# Patient Record
Sex: FEMALE | Race: WHITE | NOT HISPANIC OR LATINO | Employment: OTHER
[De-identification: names, ages, dates, MRNs, and addresses within clinical notes are randomized per-mention and may not be internally consistent; named-entity substitution may affect disease eponyms.]

---

## 2017-01-19 PROBLEM — H25.9 AGE-RELATED CATARACT OF BOTH EYES, UNSPECIFIED AGE-RELATED CATARACT TYPE: Status: ACTIVE | Noted: 2017-01-19

## 2017-01-19 PROBLEM — H25.9 AGE-RELATED CATARACT OF BOTH EYES, UNSPECIFIED AGE-RELATED CATARACT TYPE: Status: RESOLVED | Noted: 2017-01-19 | Resolved: 2017-01-19

## 2017-01-19 PROBLEM — H91.93 BILATERAL HEARING LOSS, UNSPECIFIED HEARING LOSS TYPE: Status: ACTIVE | Noted: 2017-01-19

## 2017-01-19 PROCEDURE — 84443 ASSAY THYROID STIM HORMONE: CPT | Performed by: INTERNAL MEDICINE

## 2017-01-19 PROCEDURE — 80061 LIPID PANEL: CPT | Performed by: INTERNAL MEDICINE

## 2017-01-19 PROCEDURE — 36415 COLL VENOUS BLD VENIPUNCTURE: CPT | Performed by: INTERNAL MEDICINE

## 2017-01-19 PROCEDURE — 82306 VITAMIN D 25 HYDROXY: CPT | Performed by: INTERNAL MEDICINE

## 2017-01-19 PROCEDURE — 80053 COMPREHEN METABOLIC PANEL: CPT | Performed by: INTERNAL MEDICINE

## 2017-03-22 PROCEDURE — 84295 ASSAY OF SERUM SODIUM: CPT | Performed by: INTERNAL MEDICINE

## 2017-03-22 PROCEDURE — 36415 COLL VENOUS BLD VENIPUNCTURE: CPT | Performed by: INTERNAL MEDICINE

## 2017-05-25 PROBLEM — H90.3 SENSORINEURAL HEARING LOSS (SNHL) OF BOTH EARS: Status: ACTIVE | Noted: 2017-05-25

## 2017-05-25 PROBLEM — H93.13 TINNITUS OF BOTH EARS: Status: ACTIVE | Noted: 2017-05-25

## 2017-07-06 PROBLEM — H90.3 SENSORINEURAL HEARING LOSS (SNHL), BILATERAL: Status: ACTIVE | Noted: 2017-05-25

## 2017-07-20 PROCEDURE — 80048 BASIC METABOLIC PNL TOTAL CA: CPT | Performed by: INTERNAL MEDICINE

## 2017-07-20 PROCEDURE — 80061 LIPID PANEL: CPT | Performed by: INTERNAL MEDICINE

## 2017-07-20 PROCEDURE — 82306 VITAMIN D 25 HYDROXY: CPT | Performed by: INTERNAL MEDICINE

## 2017-07-20 PROCEDURE — 36415 COLL VENOUS BLD VENIPUNCTURE: CPT | Performed by: INTERNAL MEDICINE

## 2017-09-28 PROBLEM — M54.41 ACUTE RIGHT-SIDED LOW BACK PAIN WITH RIGHT-SIDED SCIATICA: Status: ACTIVE | Noted: 2017-09-28

## 2017-10-04 PROBLEM — G89.29 CHRONIC RIGHT-SIDED LOW BACK PAIN WITH RIGHT-SIDED SCIATICA: Status: ACTIVE | Noted: 2017-10-04

## 2017-10-04 PROBLEM — M54.41 CHRONIC RIGHT-SIDED LOW BACK PAIN WITH RIGHT-SIDED SCIATICA: Status: ACTIVE | Noted: 2017-10-04

## 2017-11-15 PROBLEM — M51.379 DEGENERATION OF LUMBAR OR LUMBOSACRAL INTERVERTEBRAL DISC: Status: ACTIVE | Noted: 2017-11-15

## 2017-11-15 PROBLEM — M41.26 OTHER IDIOPATHIC SCOLIOSIS, LUMBAR REGION: Status: ACTIVE | Noted: 2017-11-15

## 2017-11-15 PROBLEM — M47.817 LUMBOSACRAL SPONDYLOSIS WITHOUT MYELOPATHY: Status: ACTIVE | Noted: 2017-11-15

## 2017-11-15 PROBLEM — M51.37 DEGENERATION OF LUMBAR OR LUMBOSACRAL INTERVERTEBRAL DISC: Status: ACTIVE | Noted: 2017-11-15

## 2017-11-28 ENCOUNTER — HOSPITAL (OUTPATIENT)
Dept: OTHER | Age: 77
End: 2017-11-28
Attending: INTERNAL MEDICINE

## 2018-01-03 PROCEDURE — 87081 CULTURE SCREEN ONLY: CPT | Performed by: PHYSICAL MEDICINE & REHABILITATION

## 2018-01-04 ENCOUNTER — HOSPITAL (OUTPATIENT)
Dept: OTHER | Age: 78
End: 2018-01-04
Attending: INTERNAL MEDICINE

## 2018-01-04 LAB
ANALYZER ANC (IANC): ABNORMAL
ANION GAP SERPL CALC-SCNC: 15 MMOL/L (ref 10–20)
APTT PPP: 30 SECONDS (ref 22–30)
APTT PPP: NORMAL S
BASOPHILS # BLD: 0 THOUSAND/MCL (ref 0–0.3)
BASOPHILS NFR BLD: 0 %
BUN SERPL-MCNC: 16 MG/DL (ref 6–20)
BUN/CREAT SERPL: 27 (ref 7–25)
CALCIUM SERPL-MCNC: 9 MG/DL (ref 8.4–10.2)
CHLORIDE: 97 MMOL/L (ref 98–107)
CO2 SERPL-SCNC: 24 MMOL/L (ref 21–32)
CREAT SERPL-MCNC: 0.6 MG/DL (ref 0.51–0.95)
DIFFERENTIAL METHOD BLD: ABNORMAL
EOSINOPHIL # BLD: 0.1 THOUSAND/MCL (ref 0.1–0.5)
EOSINOPHIL NFR BLD: 1 %
ERYTHROCYTE [DISTWIDTH] IN BLOOD: 13.9 % (ref 11–15)
GLUCOSE SERPL-MCNC: 113 MG/DL (ref 65–99)
HEMATOCRIT: 32 % (ref 36–46.5)
HGB BLD-MCNC: 10.8 GM/DL (ref 12–15.5)
INR PPP: 1.1
LYMPHOCYTES # BLD: 1 THOUSAND/MCL (ref 1–4)
LYMPHOCYTES NFR BLD: 11 %
MCH RBC QN AUTO: 28.9 PG (ref 26–34)
MCHC RBC AUTO-ENTMCNC: 33.8 GM/DL (ref 32–36.5)
MCV RBC AUTO: 85.6 FL (ref 78–100)
MONOCYTES # BLD: 1 THOUSAND/MCL (ref 0.3–0.9)
MONOCYTES NFR BLD: 10 %
NEUTROPHILS # BLD: 7.7 THOUSAND/MCL (ref 1.8–7.7)
NEUTROPHILS NFR BLD: 78 %
NEUTS SEG NFR BLD: ABNORMAL %
PERCENT NRBC: ABNORMAL
PLATELET # BLD: 265 THOUSAND/MCL (ref 140–450)
POTASSIUM SERPL-SCNC: 3.6 MMOL/L (ref 3.4–5.1)
PROTHROMBIN TIME: 11.2 SECONDS (ref 9.7–11.8)
PROTHROMBIN TIME: NORMAL
RBC # BLD: 3.74 MILLION/MCL (ref 4–5.2)
SODIUM SERPL-SCNC: 132 MMOL/L (ref 135–145)
WBC # BLD: 9.7 THOUSAND/MCL (ref 4.2–11)

## 2018-01-05 LAB
ALBUMIN SERPL-MCNC: 3.1 GM/DL (ref 3.6–5.1)
ALBUMIN/GLOB SERPL: 0.8 {RATIO} (ref 1–2.4)
ALP SERPL-CCNC: 94 UNIT/L (ref 45–117)
ALT SERPL-CCNC: 14 UNIT/L
ANALYZER ANC (IANC): ABNORMAL
ANION GAP SERPL CALC-SCNC: 15 MMOL/L (ref 10–20)
AST SERPL-CCNC: 16 UNIT/L
BASOPHILS # BLD: 0 THOUSAND/MCL (ref 0–0.3)
BASOPHILS NFR BLD: 0 %
BILIRUB SERPL-MCNC: 0.3 MG/DL (ref 0.2–1)
BUN SERPL-MCNC: 22 MG/DL (ref 6–20)
BUN/CREAT SERPL: 35 (ref 7–25)
CALCIUM SERPL-MCNC: 8.7 MG/DL (ref 8.4–10.2)
CHLORIDE: 99 MMOL/L (ref 98–107)
CO2 SERPL-SCNC: 24 MMOL/L (ref 21–32)
CREAT SERPL-MCNC: 0.63 MG/DL (ref 0.51–0.95)
DIFFERENTIAL METHOD BLD: ABNORMAL
EOSINOPHIL # BLD: 0.1 THOUSAND/MCL (ref 0.1–0.5)
EOSINOPHIL NFR BLD: 1 %
ERYTHROCYTE [DISTWIDTH] IN BLOOD: 13.9 % (ref 11–15)
GLOBULIN SER-MCNC: 3.8 GM/DL (ref 2–4)
GLUCOSE SERPL-MCNC: 119 MG/DL (ref 65–99)
HEMATOCRIT: 31.4 % (ref 36–46.5)
HGB BLD-MCNC: 10.6 GM/DL (ref 12–15.5)
IRON SATN MFR SERPL: 9 % (ref 15–45)
IRON SERPL-MCNC: 23 MCG/DL (ref 50–170)
LYMPHOCYTES # BLD: 0.9 THOUSAND/MCL (ref 1–4)
LYMPHOCYTES NFR BLD: 12 %
MCH RBC QN AUTO: 28.6 PG (ref 26–34)
MCHC RBC AUTO-ENTMCNC: 33.8 GM/DL (ref 32–36.5)
MCV RBC AUTO: 84.6 FL (ref 78–100)
MONOCYTES # BLD: 0.9 THOUSAND/MCL (ref 0.3–0.9)
MONOCYTES NFR BLD: 12 %
NEUTROPHILS # BLD: 5.8 THOUSAND/MCL (ref 1.8–7.7)
NEUTROPHILS NFR BLD: 75 %
NEUTS SEG NFR BLD: ABNORMAL %
PERCENT NRBC: ABNORMAL
PLATELET # BLD: 265 THOUSAND/MCL (ref 140–450)
POTASSIUM SERPL-SCNC: 3.5 MMOL/L (ref 3.4–5.1)
PROT SERPL-MCNC: 6.9 GM/DL (ref 6.4–8.2)
PTH-INTACT SERPL-MCNC: 33 PG/ML (ref 14–72)
RBC # BLD: 3.71 MILLION/MCL (ref 4–5.2)
SODIUM SERPL-SCNC: 134 MMOL/L (ref 135–145)
T3FREE SERPL-MCNC: 2 PG/ML (ref 2.2–4)
T4 FREE SERPL-MCNC: 1.5 NG/DL (ref 0.8–1.5)
TIBC SERPL-MCNC: 257 MCG/DL (ref 250–450)
TSH SERPL-ACNC: 0.24 MCUNIT/ML (ref 0.35–5)
VIT B12 SERPL-MCNC: 494 PG/ML (ref 211–911)
WBC # BLD: 7.7 THOUSAND/MCL (ref 4.2–11)

## 2018-01-06 LAB
ANALYZER ANC (IANC): ABNORMAL
ANION GAP SERPL CALC-SCNC: 14 MMOL/L (ref 10–20)
BASOPHILS # BLD: 0 THOUSAND/MCL (ref 0–0.3)
BASOPHILS NFR BLD: 0 %
BUN SERPL-MCNC: 34 MG/DL (ref 6–20)
BUN/CREAT SERPL: 43 (ref 7–25)
CALCIUM SERPL-MCNC: 8.9 MG/DL (ref 8.4–10.2)
CHLORIDE: 98 MMOL/L (ref 98–107)
CO2 SERPL-SCNC: 25 MMOL/L (ref 21–32)
CREAT SERPL-MCNC: 0.8 MG/DL (ref 0.51–0.95)
DIFFERENTIAL METHOD BLD: ABNORMAL
EOSINOPHIL # BLD: 0.2 THOUSAND/MCL (ref 0.1–0.5)
EOSINOPHIL NFR BLD: 2 %
ERYTHROCYTE [DISTWIDTH] IN BLOOD: 13.8 % (ref 11–15)
GLUCOSE SERPL-MCNC: 107 MG/DL (ref 65–99)
HEMATOCRIT: 32.2 % (ref 36–46.5)
HGB BLD-MCNC: 10.7 GM/DL (ref 12–15.5)
LYMPHOCYTES # BLD: 2.4 THOUSAND/MCL (ref 1–4)
LYMPHOCYTES NFR BLD: 29 %
MCH RBC QN AUTO: 28.2 PG (ref 26–34)
MCHC RBC AUTO-ENTMCNC: 33.2 GM/DL (ref 32–36.5)
MCV RBC AUTO: 85 FL (ref 78–100)
MONOCYTES # BLD: 1 THOUSAND/MCL (ref 0.3–0.9)
MONOCYTES NFR BLD: 12 %
NEUTROPHILS # BLD: 4.6 THOUSAND/MCL (ref 1.8–7.7)
NEUTROPHILS NFR BLD: 57 %
NEUTS SEG NFR BLD: ABNORMAL %
PERCENT NRBC: ABNORMAL
PLATELET # BLD: 271 THOUSAND/MCL (ref 140–450)
POTASSIUM SERPL-SCNC: 3.8 MMOL/L (ref 3.4–5.1)
RBC # BLD: 3.79 MILLION/MCL (ref 4–5.2)
SODIUM SERPL-SCNC: 133 MMOL/L (ref 135–145)
WBC # BLD: 8.2 THOUSAND/MCL (ref 4.2–11)

## 2018-01-07 LAB
ANALYZER ANC (IANC): ABNORMAL
ANION GAP SERPL CALC-SCNC: 14 MMOL/L (ref 10–20)
BASOPHILS # BLD: 0 THOUSAND/MCL (ref 0–0.3)
BASOPHILS NFR BLD: 0 %
BUN SERPL-MCNC: 33 MG/DL (ref 6–20)
BUN/CREAT SERPL: 46 (ref 7–25)
CALCIUM SERPL-MCNC: 8.7 MG/DL (ref 8.4–10.2)
CHLORIDE: 96 MMOL/L (ref 98–107)
CO2 SERPL-SCNC: 26 MMOL/L (ref 21–32)
CREAT SERPL-MCNC: 0.72 MG/DL (ref 0.51–0.95)
DIFFERENTIAL METHOD BLD: ABNORMAL
EOSINOPHIL # BLD: 0.1 THOUSAND/MCL (ref 0.1–0.5)
EOSINOPHIL NFR BLD: 2 %
ERYTHROCYTE [DISTWIDTH] IN BLOOD: 13.7 % (ref 11–15)
GLUCOSE SERPL-MCNC: 100 MG/DL (ref 65–99)
HEMATOCRIT: 30.3 % (ref 36–46.5)
HGB BLD-MCNC: 10.3 GM/DL (ref 12–15.5)
LYMPHOCYTES # BLD: 2.1 THOUSAND/MCL (ref 1–4)
LYMPHOCYTES NFR BLD: 24 %
MCH RBC QN AUTO: 29 PG (ref 26–34)
MCHC RBC AUTO-ENTMCNC: 34 GM/DL (ref 32–36.5)
MCV RBC AUTO: 85.4 FL (ref 78–100)
MONOCYTES # BLD: 1.1 THOUSAND/MCL (ref 0.3–0.9)
MONOCYTES NFR BLD: 13 %
NEUTROPHILS # BLD: 5.4 THOUSAND/MCL (ref 1.8–7.7)
NEUTROPHILS NFR BLD: 61 %
NEUTS SEG NFR BLD: ABNORMAL %
PERCENT NRBC: ABNORMAL
PLATELET # BLD: 274 THOUSAND/MCL (ref 140–450)
POTASSIUM SERPL-SCNC: 4.4 MMOL/L (ref 3.4–5.1)
RBC # BLD: 3.55 MILLION/MCL (ref 4–5.2)
SODIUM SERPL-SCNC: 132 MMOL/L (ref 135–145)
WBC # BLD: 8.7 THOUSAND/MCL (ref 4.2–11)

## 2018-03-02 PROBLEM — M54.41 ACUTE RIGHT-SIDED LOW BACK PAIN WITH RIGHT-SIDED SCIATICA: Status: RESOLVED | Noted: 2017-09-28 | Resolved: 2018-03-02

## 2018-03-02 PROBLEM — F11.20 UNCOMPLICATED OPIOID DEPENDENCE (HCC): Status: ACTIVE | Noted: 2018-03-02

## 2018-03-02 PROCEDURE — 87081 CULTURE SCREEN ONLY: CPT | Performed by: INTERNAL MEDICINE

## 2018-03-14 PROBLEM — M81.0 OSTEOPOROSIS: Status: ACTIVE | Noted: 2018-03-14

## 2018-03-14 PROCEDURE — 83970 ASSAY OF PARATHORMONE: CPT | Performed by: PHYSICIAN ASSISTANT

## 2018-03-25 PROBLEM — R29.898 MUSCULAR DECONDITIONING: Status: ACTIVE | Noted: 2018-03-25

## 2018-04-04 PROBLEM — M81.0 OSTEOPOROSIS: Status: RESOLVED | Noted: 2018-03-14 | Resolved: 2018-04-04

## 2018-04-04 PROBLEM — H91.93 BILATERAL HEARING LOSS, UNSPECIFIED HEARING LOSS TYPE: Status: RESOLVED | Noted: 2017-01-19 | Resolved: 2018-04-04

## 2018-04-25 PROBLEM — K42.9 PERIUMBILICAL HERNIA: Status: ACTIVE | Noted: 2018-04-25

## 2018-08-21 ENCOUNTER — HOSPITAL (OUTPATIENT)
Dept: OTHER | Age: 78
End: 2018-08-21
Attending: INTERNAL MEDICINE

## 2018-12-04 ENCOUNTER — HOSPITAL (OUTPATIENT)
Dept: OTHER | Age: 78
End: 2018-12-04
Attending: INTERNAL MEDICINE

## 2019-04-16 PROBLEM — Z51.81 ENCOUNTER FOR MEDICATION MONITORING: Status: ACTIVE | Noted: 2019-04-16

## 2019-04-16 PROBLEM — R63.5 WEIGHT GAIN: Status: ACTIVE | Noted: 2019-04-16

## 2019-04-16 PROBLEM — M19.011 PRIMARY OSTEOARTHRITIS OF RIGHT SHOULDER: Status: ACTIVE | Noted: 2019-04-16

## 2019-04-16 PROBLEM — Z13.1 DIABETES MELLITUS SCREENING: Status: ACTIVE | Noted: 2019-04-16

## 2019-07-11 PROBLEM — M75.101 ROTATOR CUFF SYNDROME OF BOTH SHOULDERS: Status: ACTIVE | Noted: 2019-07-11

## 2019-07-11 PROBLEM — M75.102 ROTATOR CUFF SYNDROME OF BOTH SHOULDERS: Status: ACTIVE | Noted: 2019-07-11

## 2019-10-17 PROBLEM — M25.511 BILATERAL SHOULDER PAIN, UNSPECIFIED CHRONICITY: Status: ACTIVE | Noted: 2019-10-17

## 2019-10-17 PROBLEM — M25.512 BILATERAL SHOULDER PAIN, UNSPECIFIED CHRONICITY: Status: ACTIVE | Noted: 2019-10-17

## 2019-10-17 PROBLEM — M18.12 PRIMARY OSTEOARTHRITIS OF FIRST CARPOMETACARPAL JOINT OF LEFT HAND: Status: ACTIVE | Noted: 2019-10-17

## 2019-12-12 ENCOUNTER — HOSPITAL (OUTPATIENT)
Dept: OTHER | Age: 79
End: 2019-12-12
Attending: INTERNAL MEDICINE

## 2020-02-14 PROBLEM — M18.11 PRIMARY OSTEOARTHRITIS OF FIRST CARPOMETACARPAL JOINT OF RIGHT HAND: Status: ACTIVE | Noted: 2020-02-14

## 2020-09-04 ENCOUNTER — HOSPITAL ENCOUNTER (OUTPATIENT)
Dept: GENERAL RADIOLOGY | Age: 80
Discharge: HOME | End: 2020-09-04
Attending: INTERNAL MEDICINE

## 2020-09-04 DIAGNOSIS — M89.9 DISORDER OF BONE, UNSPECIFIED: ICD-10-CM

## 2020-09-04 PROCEDURE — 77080 DXA BONE DENSITY AXIAL: CPT

## 2020-12-22 ENCOUNTER — HOSPITAL ENCOUNTER (OUTPATIENT)
Dept: MAMMOGRAPHY | Age: 80
Discharge: HOME | End: 2020-12-22
Attending: INTERNAL MEDICINE

## 2020-12-22 DIAGNOSIS — Z12.31 ENCOUNTER FOR SCREENING MAMMOGRAM FOR BREAST CANCER: ICD-10-CM

## 2020-12-22 PROCEDURE — 77063 BREAST TOMOSYNTHESIS BI: CPT

## 2021-05-11 PROBLEM — M51.379 DEGENERATION OF LUMBAR OR LUMBOSACRAL INTERVERTEBRAL DISC: Status: RESOLVED | Noted: 2017-11-15 | Resolved: 2021-05-11

## 2021-05-11 PROBLEM — M51.37 DEGENERATION OF LUMBAR OR LUMBOSACRAL INTERVERTEBRAL DISC: Status: RESOLVED | Noted: 2017-11-15 | Resolved: 2021-05-11

## 2021-09-01 PROBLEM — M25.571 PAIN IN JOINT INVOLVING RIGHT ANKLE AND FOOT: Status: ACTIVE | Noted: 2021-09-01

## 2021-09-01 PROBLEM — M25.571 SINUS TARSI SYNDROME OF RIGHT ANKLE: Status: ACTIVE | Noted: 2021-09-01

## 2021-09-01 PROBLEM — Q66.6 CALCANEOVALGUS DEFORMITY OF BOTH FEET: Status: ACTIVE | Noted: 2021-09-01

## 2021-09-01 PROBLEM — M19.079 ARTHRITIS OF ANKLE: Status: ACTIVE | Noted: 2021-09-01

## 2021-12-30 ENCOUNTER — HOSPITAL ENCOUNTER (OUTPATIENT)
Dept: MAMMOGRAPHY | Age: 81
Discharge: HOME | End: 2021-12-30
Attending: INTERNAL MEDICINE

## 2021-12-30 DIAGNOSIS — Z12.31 ENCOUNTER FOR SCREENING MAMMOGRAM FOR MALIGNANT NEOPLASM OF BREAST: ICD-10-CM

## 2021-12-30 PROCEDURE — 77063 BREAST TOMOSYNTHESIS BI: CPT

## 2022-03-15 PROBLEM — M19.079 ARTHRITIS OF ANKLE: Status: RESOLVED | Noted: 2021-09-01 | Resolved: 2022-03-15

## 2022-03-15 PROBLEM — R29.898 MUSCULAR DECONDITIONING: Status: RESOLVED | Noted: 2018-03-25 | Resolved: 2022-03-15

## 2022-03-15 PROBLEM — M25.571 PAIN IN JOINT INVOLVING RIGHT ANKLE AND FOOT: Status: RESOLVED | Noted: 2021-09-01 | Resolved: 2022-03-15

## 2022-03-15 PROBLEM — K42.9 PERIUMBILICAL HERNIA: Status: RESOLVED | Noted: 2018-04-25 | Resolved: 2022-03-15

## 2022-09-27 ENCOUNTER — HOSPITAL ENCOUNTER (OUTPATIENT)
Dept: GENERAL RADIOLOGY | Age: 82
Discharge: HOME | End: 2022-09-27
Attending: INTERNAL MEDICINE

## 2022-09-27 DIAGNOSIS — M81.0 SENILE OSTEOPOROSIS: ICD-10-CM

## 2022-09-27 PROCEDURE — 77080 DXA BONE DENSITY AXIAL: CPT

## 2023-01-12 ENCOUNTER — HOSPITAL ENCOUNTER (OUTPATIENT)
Dept: MAMMOGRAPHY | Age: 83
Discharge: HOME | End: 2023-01-12
Attending: INTERNAL MEDICINE

## 2023-01-12 DIAGNOSIS — Z12.31 VISIT FOR SCREENING MAMMOGRAM: ICD-10-CM

## 2023-01-12 PROCEDURE — 77063 BREAST TOMOSYNTHESIS BI: CPT

## 2023-01-12 PROCEDURE — 77067 SCR MAMMO BI INCL CAD: CPT

## 2024-02-14 DIAGNOSIS — Z12.31 VISIT FOR SCREENING MAMMOGRAM: Primary | ICD-10-CM

## 2024-02-21 ENCOUNTER — HOSPITAL ENCOUNTER (OUTPATIENT)
Dept: MAMMOGRAPHY | Age: 84
Discharge: HOME | End: 2024-02-21
Attending: INTERNAL MEDICINE

## 2024-02-21 DIAGNOSIS — Z12.31 VISIT FOR SCREENING MAMMOGRAM: ICD-10-CM

## 2024-02-21 PROCEDURE — 77067 SCR MAMMO BI INCL CAD: CPT

## 2024-12-13 ENCOUNTER — LAB REQUISITION (OUTPATIENT)
Age: 84
End: 2024-12-13
Payer: MEDICARE

## 2024-12-13 DIAGNOSIS — D64.9 NORMOCYTIC ANEMIA: ICD-10-CM

## 2024-12-13 PROCEDURE — 88184 FLOWCYTOMETRY/ TC 1 MARKER: CPT | Performed by: INTERNAL MEDICINE

## 2024-12-13 PROCEDURE — 88185 FLOWCYTOMETRY/TC ADD-ON: CPT | Performed by: INTERNAL MEDICINE

## 2024-12-13 PROCEDURE — 88342 IMHCHEM/IMCYTCHM 1ST ANTB: CPT | Performed by: PATHOLOGY

## 2024-12-17 ENCOUNTER — LAB REQUISITION (OUTPATIENT)
Age: 84
End: 2024-12-17
Payer: MEDICARE

## 2024-12-17 DIAGNOSIS — D48.9 NEOPLASM OF UNCERTAIN BEHAVIOR: ICD-10-CM

## 2024-12-27 LAB
CD10 CELLS NFR SPEC: <1 %
CD10/CD19: <1 %
CD19 CELLS NFR SPEC: 7 %
CD19+/CD200+: 4 %
CD2 CELLS NFR SPEC: 92 %
CD20 CELLS NFR SPEC: 7 %
CD200 CELLS: 8 %
CD3 CELLS NFR SPEC: 78 %
CD3+/TCRGD+: <1 %
CD3+CD4+ CELLS NFR SPEC: 56 %
CD3+CD4+ CELLS/CD3+CD8+ CLL SPEC: 2.5
CD3+CD8+ CELLS NFR SPEC: 22 %
CD3-/CD56+: 14 %
CD34 CELLS NFR SPEC: <1 %
CD38 CELLS NFR SPEC: 6 %
CD38+/CD19+: <1 %
CD45 CELLS NFR SPEC: 100 %
CD5 CELLS NFR SPEC: 77 %
CD5/CD19 CELLS: <1 %
CD7 CELLS NFR SPEC: 88 %
CELL SURF KAPPA/LAMBDA RATIO: 2
CELL SURF LAMBDA LIGHT CHAIN: 2 %
CELL SURFACE KAPPA LIGHT CHAIN: 4 %
TCR G-D CELLS NFR SPEC: <1 %

## 2025-04-16 RX ORDER — LATANOPROST 50 UG/ML
SOLUTION/ DROPS OPHTHALMIC EVERY MORNING
COMMUNITY
Start: 2022-05-10

## 2025-04-16 RX ORDER — MULTIVITAMIN WITH IRON
250 TABLET ORAL 2 TIMES DAILY
COMMUNITY

## 2025-04-16 RX ORDER — FUROSEMIDE 20 MG/1
20 TABLET ORAL EVERY MORNING
COMMUNITY
Start: 2024-12-18

## 2025-04-16 RX ORDER — HYDROCODONE BITARTRATE AND ACETAMINOPHEN 10; 325 MG/1; MG/1
TABLET ORAL
COMMUNITY
Start: 2018-01-08 | End: 2025-04-18

## 2025-04-16 RX ORDER — AZELASTINE 1 MG/ML
2 SPRAY, METERED NASAL 2 TIMES DAILY PRN
COMMUNITY
Start: 2025-02-23

## 2025-04-16 RX ORDER — CODEINE PHOSPHATE AND GUAIFENESIN 10; 100 MG/5ML; MG/5ML
5 SOLUTION ORAL 4 TIMES DAILY PRN
COMMUNITY
Start: 2024-04-09

## 2025-04-16 NOTE — DISCHARGE INSTRUCTIONS
HOME INSTRUCTIONS  What to expect post-operatively:     Your Dressings:  Do NOT remove post operative dressing/splint until office visit  You may shower the next day, cover the dressing to keep dry    Your Discomfort/Pain:  Mild/Moderate discomfort is NORMAL after surgery  This usually improves after 3 days  Take Over-the-counter medications for mild pain if appropriate  A prescription for moderate pain/inflammation will be provided  Swelling and bruising are very NORMAL and EXPECTED  Your Fingers may be more difficult to move due to swelling   Elevating the limb above the heart may alleviate swelling  You may rest limb on pillows when seated/laying down.    Your Restrictions:  You are able to drive the next day if no longer taking narcotics  No pushing, pulling or lifting with surgical limb  For work notes, inform my medical assistant, who provide this for you  Call [phone] to arrange pick-up or if you would like it faxed    Your First Office Visit (~2 weeks):  Sutures will be removed if neccessary  New “skin tape” may be placed to protect your incision  Begin scar massage one week after sutures are removed     If you have any Questions, please call [phone]      [facility] [place] HOME CARE INSTRUCTIONS: POST-OP ANESTHESIA  The medication that you received for sedation or general anesthesia can last up to 24 hours. Your judgment and reflexes may be altered, even if you feel like your normal self.      We Recommend:   Do not drive any motor vehicle or bicycle   Avoid mowing the lawn, playing sports, or working with power tools/applicances (power saws, electric knives or mixers)   That you have someone stay with you on your first night home   Do not drink alcohol or take sleeping pills or tranquilizers   Do not sign legal documents within 24 hours of your procedure   If you had a nerve block for your surgery, take extra care not to put any pressure on your arm or hand for 24 hours    It is normal:  For you to have a  sore throat if you had a breathing tube during surgery (while you were asleep!). The sore throat should get better within 48 hours. You can gargle with warm salt water (1/2 tsp in 4 oz warm water) or use a throat lozenge for comfort  To feel muscle aches or soreness especially in the abdomen, chest or neck. The achy feeling should go away in the next 24 hours  To feel weak, sleepy or \"wiped out\". Your should start feeling better in the next 24 hours.   To experience mild discomforts such as sore lip or tongue, headache, cramps, gas pains or a bloated feeling in your abdomen.   To experience mild back pain or soreness for a day or two if you had spinal or epidural anesthesia.   If you had laparoscopic surgery, to feel shoulder pain or discomfort on the day of surgery.   For some patients to have nausea after surgery/anesthesia    If you feel nausea or experience vomiting:   Try to move around less.   Eat less than usual or drink only liquids until the next morning   Nausea should resolve in about 24 hours    If you have a problem when you are at home:    Call your surgeons office   Discharge Instructions: After Your Surgery  You’ve just had surgery. During surgery, you were given medicine called anesthesia to keep you relaxed and free of pain. After surgery, you may have some pain or nausea. This is common. Here are some tips for feeling better and getting well after surgery.   Going home  Your healthcare provider will show you how to take care of yourself when you go home. They'll also answer your questions. Have an adult family member or friend drive you home. For the first 24 hours after your surgery:   Don't drive or use heavy equipment.  Don't make important decisions or sign legal papers.  Take medicines as directed.  Don't drink alcohol.  Have someone stay with you, if needed. They can watch for problems and help keep you safe.  Be sure to go to all follow-up visits with your healthcare provider. And rest  after your surgery for as long as your provider tells you to.   Coping with pain  If you have pain after surgery, pain medicine will help you feel better. Take it as directed, before pain becomes severe. Also, ask your healthcare provider or pharmacist about other ways to control pain. This might be with heat, ice, or relaxation. And follow any other instructions your surgeon or nurse gives you.      Stay on schedule with your medicine.     Tips for taking pain medicine  To get the best relief possible, remember these points:   Pain medicines can upset your stomach. Taking them with a little food may help.  Most pain relievers taken by mouth need at least 20 to 30 minutes to start to work.  Don't wait till your pain becomes severe before you take your medicine. Try to time your medicine so that you can take it before starting an activity. This might be before you get dressed, go for a walk, or sit down for dinner.  Constipation is a common side effect of some pain medicines. Call your healthcare provider before taking any medicines such as laxatives or stool softeners to help ease constipation. Also ask if you should skip any foods. Drinking lots of fluids and eating foods such as fruits and vegetables that are high in fiber can also help. Remember, don't take laxatives unless your surgeon has prescribed them.  Drinking alcohol and taking pain medicine can cause dizziness and slow your breathing. It can even be deadly. Don't drink alcohol while taking pain medicine.  Pain medicine can make you react more slowly to things. Don't drive or run machinery while taking pain medicine.  Your healthcare provider may tell you to take acetaminophen to help ease your pain. Ask them how much you're supposed to take each day. Acetaminophen or other pain relievers may interact with your prescription medicines or other over-the-counter (OTC) medicines. Some prescription medicines have acetaminophen and other ingredients in them.  Using both prescription and OTC acetaminophen for pain can cause you to accidentally overdose. Read the labels on your OTC medicines with care. This will help you to clearly know the list of ingredients, how much to take, and any warnings. It may also help you not take too much acetaminophen. If you have questions or don't understand the information, ask your pharmacist or healthcare provider to explain it to you before you take the OTC medicine.   Managing nausea  Some people have an upset stomach (nausea) after surgery. This is often because of anesthesia, pain, or pain medicine, less movement of food in the stomach, or the stress of surgery. These tips will help you handle nausea and eat healthy foods as you get better. If you were on a special food plan before surgery, ask your healthcare provider if you should follow it while you get better. Check with your provider on how your eating should progress. It may depend on the surgery you had. These general tips may help:   Don't push yourself to eat. Your body will tell you when to eat and how much.  Start off with clear liquids and soup. They're easier to digest.  Next try semi-solid foods as you feel ready. These include mashed potatoes, applesauce, and gelatin.  Slowly move to solid foods. Don’t eat fatty, rich, or spicy foods at first.  Don't force yourself to have 3 large meals a day. Instead eat smaller amounts more often.  Take pain medicines with a small amount of solid food, such as crackers or toast. This helps prevent nausea.  When to call your healthcare provider  Call your healthcare provider right away if you have any of these:   You still have too much pain, or the pain gets worse, after taking the medicine. The medicine may not be strong enough. Or there may be a complication from the surgery.  You feel too sleepy, dizzy, or groggy. The medicine may be too strong.  Side effects such as nausea or vomiting. Your healthcare provider may advise taking  other medicines to .  Skin changes such as rash, itching, or hives. This may mean you have an allergic reaction. Your provider may advise taking other medicines.  The incision looks different (for instance, part of it opens up).  Bleeding or fluid leaking from the incision site, and weren't told to expect that.  Fever of 100.4°F (38°C) or higher, or as directed by your provider.  Call 911  Call 911 right away if you have:   Trouble breathing  Facial swelling    If you have obstructive sleep apnea   You were given anesthesia medicine during surgery to keep you comfortable and free of pain. After surgery, you may have more apnea spells because of this medicine and other medicines you were given. The spells may last longer than normal.    At home:  Keep using the continuous positive airway pressure (CPAP) device when you sleep. Unless your healthcare provider tells you not to, use it when you sleep, day or night. CPAP is a common device used to treat obstructive sleep apnea.  Talk with your provider before taking any pain medicine, muscle relaxants, or sedatives. Your provider will tell you about the possible dangers of taking these medicines.  Contact your provider if your sleeping changes a lot even when taking medicines as directed.  [employer] last reviewed this educational content on 10/1/2021  © 4325-5835 [employer]. All rights reserved. This information is not intended as a substitute for professional medical care. Always follow your healthcare professional's instructions.

## 2025-04-18 ENCOUNTER — APPOINTMENT (OUTPATIENT)
Dept: GENERAL RADIOLOGY | Facility: HOSPITAL | Age: 85
End: 2025-04-18
Attending: ORTHOPAEDIC SURGERY
Payer: MEDICARE

## 2025-04-18 ENCOUNTER — ANESTHESIA (OUTPATIENT)
Dept: SURGERY | Facility: HOSPITAL | Age: 85
End: 2025-04-18
Payer: MEDICARE

## 2025-04-18 ENCOUNTER — HOSPITAL ENCOUNTER (OUTPATIENT)
Facility: HOSPITAL | Age: 85
Setting detail: HOSPITAL OUTPATIENT SURGERY
Discharge: HOME | End: 2025-04-18
Attending: ORTHOPAEDIC SURGERY | Admitting: ORTHOPAEDIC SURGERY
Payer: MEDICARE

## 2025-04-18 ENCOUNTER — ANESTHESIA EVENT (OUTPATIENT)
Dept: SURGERY | Facility: HOSPITAL | Age: 85
End: 2025-04-18
Payer: MEDICARE

## 2025-04-18 VITALS
RESPIRATION RATE: 18 BRPM | DIASTOLIC BLOOD PRESSURE: 57 MMHG | HEART RATE: 91 BPM | SYSTOLIC BLOOD PRESSURE: 113 MMHG | OXYGEN SATURATION: 94 % | WEIGHT: 160 LBS | HEIGHT: 58 IN | TEMPERATURE: 98 F | BODY MASS INDEX: 33.58 KG/M2

## 2025-04-18 DIAGNOSIS — S52.022D CLOSED FRACTURE OF OLECRANON PROCESS OF LEFT ULNA WITH ROUTINE HEALING, SUBSEQUENT ENCOUNTER: Primary | ICD-10-CM

## 2025-04-18 PROCEDURE — 76000 FLUOROSCOPY <1 HR PHYS/QHP: CPT | Performed by: ORTHOPAEDIC SURGERY

## 2025-04-18 PROCEDURE — 64415 NJX AA&/STRD BRCH PLXS IMG: CPT | Performed by: ORTHOPAEDIC SURGERY

## 2025-04-18 DEVICE — IMPLANTABLE DEVICE: Type: IMPLANTABLE DEVICE | Site: ELBOW | Status: FUNCTIONAL

## 2025-04-18 DEVICE — SCREW BNE 3.5X26MM CORT HEX DRV RECESS FT ST: Type: IMPLANTABLE DEVICE | Site: ELBOW | Status: FUNCTIONAL

## 2025-04-18 DEVICE — 2.7MM/3.5MM VA-LCP PROXIMAL OLECRANON PL 2H/LT/73MM-STER
Type: IMPLANTABLE DEVICE | Site: ELBOW | Status: FUNCTIONAL
Brand: VA-LCP

## 2025-04-18 DEVICE — SCREW BNE 2.7X18MM VA T8 STARDRV RECESS LOK: Type: IMPLANTABLE DEVICE | Site: ELBOW | Status: FUNCTIONAL

## 2025-04-18 DEVICE — SCREW BNE 3.5X22MM CORT HEX DRV RECESS FT ST: Type: IMPLANTABLE DEVICE | Site: ELBOW | Status: FUNCTIONAL

## 2025-04-18 RX ORDER — FAMOTIDINE 10 MG/ML
20 INJECTION, SOLUTION INTRAVENOUS ONCE
Status: DISCONTINUED | OUTPATIENT
Start: 2025-04-18 | End: 2025-04-18 | Stop reason: HOSPADM

## 2025-04-18 RX ORDER — SODIUM CHLORIDE, SODIUM LACTATE, POTASSIUM CHLORIDE, CALCIUM CHLORIDE 600; 310; 30; 20 MG/100ML; MG/100ML; MG/100ML; MG/100ML
INJECTION, SOLUTION INTRAVENOUS CONTINUOUS
Status: DISCONTINUED | OUTPATIENT
Start: 2025-04-18 | End: 2025-04-18

## 2025-04-18 RX ORDER — HYDROCODONE BITARTRATE AND ACETAMINOPHEN 5; 325 MG/1; MG/1
1-2 TABLET ORAL EVERY 4 HOURS PRN
Qty: 20 TABLET | Refills: 0 | OUTPATIENT
Start: 2025-04-18

## 2025-04-18 RX ORDER — HYDROCODONE BITARTRATE AND ACETAMINOPHEN 5; 325 MG/1; MG/1
1-2 TABLET ORAL EVERY 6 HOURS PRN
Qty: 30 TABLET | Refills: 0 | Status: SHIPPED | OUTPATIENT
Start: 2025-04-18

## 2025-04-18 RX ORDER — ALBUTEROL SULFATE 0.83 MG/ML
2.5 SOLUTION RESPIRATORY (INHALATION) AS NEEDED
Status: DISCONTINUED | OUTPATIENT
Start: 2025-04-18 | End: 2025-04-18

## 2025-04-18 RX ORDER — EPHEDRINE SULFATE 50 MG/ML
INJECTION, SOLUTION INTRAVENOUS AS NEEDED
Status: DISCONTINUED | OUTPATIENT
Start: 2025-04-18 | End: 2025-04-18 | Stop reason: SURG

## 2025-04-18 RX ORDER — MORPHINE SULFATE 10 MG/ML
6 INJECTION, SOLUTION INTRAMUSCULAR; INTRAVENOUS EVERY 10 MIN PRN
Status: DISCONTINUED | OUTPATIENT
Start: 2025-04-18 | End: 2025-04-18

## 2025-04-18 RX ORDER — METOCLOPRAMIDE HYDROCHLORIDE 5 MG/ML
10 INJECTION INTRAMUSCULAR; INTRAVENOUS ONCE
Status: DISCONTINUED | OUTPATIENT
Start: 2025-04-18 | End: 2025-04-18 | Stop reason: HOSPADM

## 2025-04-18 RX ORDER — IPRATROPIUM BROMIDE AND ALBUTEROL SULFATE 2.5; .5 MG/3ML; MG/3ML
3 SOLUTION RESPIRATORY (INHALATION) ONCE
Status: COMPLETED | OUTPATIENT
Start: 2025-04-18 | End: 2025-04-18

## 2025-04-18 RX ORDER — METOCLOPRAMIDE 10 MG/1
10 TABLET ORAL ONCE
Status: DISCONTINUED | OUTPATIENT
Start: 2025-04-18 | End: 2025-04-18 | Stop reason: HOSPADM

## 2025-04-18 RX ORDER — HYDROCODONE BITARTRATE AND ACETAMINOPHEN 5; 325 MG/1; MG/1
1-2 TABLET ORAL EVERY 6 HOURS PRN
Qty: 20 TABLET | Refills: 0 | OUTPATIENT
Start: 2025-04-18

## 2025-04-18 RX ORDER — ROPIVACAINE HYDROCHLORIDE 5 MG/ML
INJECTION, SOLUTION EPIDURAL; INFILTRATION; PERINEURAL
Status: COMPLETED | OUTPATIENT
Start: 2025-04-18 | End: 2025-04-18

## 2025-04-18 RX ORDER — MORPHINE SULFATE 4 MG/ML
2 INJECTION, SOLUTION INTRAMUSCULAR; INTRAVENOUS EVERY 10 MIN PRN
Status: DISCONTINUED | OUTPATIENT
Start: 2025-04-18 | End: 2025-04-18

## 2025-04-18 RX ORDER — ROCURONIUM BROMIDE 10 MG/ML
INJECTION, SOLUTION INTRAVENOUS AS NEEDED
Status: DISCONTINUED | OUTPATIENT
Start: 2025-04-18 | End: 2025-04-18 | Stop reason: SURG

## 2025-04-18 RX ORDER — LIDOCAINE HYDROCHLORIDE 10 MG/ML
INJECTION, SOLUTION EPIDURAL; INFILTRATION; INTRACAUDAL; PERINEURAL AS NEEDED
Status: DISCONTINUED | OUTPATIENT
Start: 2025-04-18 | End: 2025-04-18 | Stop reason: SURG

## 2025-04-18 RX ORDER — PHENYLEPHRINE HCL 10 MG/ML
VIAL (ML) INJECTION AS NEEDED
Status: DISCONTINUED | OUTPATIENT
Start: 2025-04-18 | End: 2025-04-18 | Stop reason: SURG

## 2025-04-18 RX ORDER — MORPHINE SULFATE 4 MG/ML
4 INJECTION, SOLUTION INTRAMUSCULAR; INTRAVENOUS EVERY 10 MIN PRN
Status: DISCONTINUED | OUTPATIENT
Start: 2025-04-18 | End: 2025-04-18

## 2025-04-18 RX ORDER — HYDROCODONE BITARTRATE AND ACETAMINOPHEN 5; 325 MG/1; MG/1
1 TABLET ORAL ONCE
Refills: 0 | Status: COMPLETED | OUTPATIENT
Start: 2025-04-18 | End: 2025-04-18

## 2025-04-18 RX ORDER — INSULIN ASPART 100 [IU]/ML
INJECTION, SOLUTION INTRAVENOUS; SUBCUTANEOUS ONCE
Status: DISCONTINUED | OUTPATIENT
Start: 2025-04-18 | End: 2025-04-18

## 2025-04-18 RX ORDER — NALOXONE HYDROCHLORIDE 0.4 MG/ML
0.08 INJECTION, SOLUTION INTRAMUSCULAR; INTRAVENOUS; SUBCUTANEOUS AS NEEDED
Status: DISCONTINUED | OUTPATIENT
Start: 2025-04-18 | End: 2025-04-18

## 2025-04-18 RX ORDER — FAMOTIDINE 20 MG/1
20 TABLET, FILM COATED ORAL ONCE
Status: DISCONTINUED | OUTPATIENT
Start: 2025-04-18 | End: 2025-04-18 | Stop reason: HOSPADM

## 2025-04-18 RX ORDER — LABETALOL HYDROCHLORIDE 5 MG/ML
5 INJECTION, SOLUTION INTRAVENOUS EVERY 5 MIN PRN
Status: DISCONTINUED | OUTPATIENT
Start: 2025-04-18 | End: 2025-04-18

## 2025-04-18 RX ORDER — ACETAMINOPHEN 500 MG
1000 TABLET ORAL ONCE
Status: DISCONTINUED | OUTPATIENT
Start: 2025-04-18 | End: 2025-04-18 | Stop reason: HOSPADM

## 2025-04-18 RX ORDER — ONDANSETRON 2 MG/ML
4 INJECTION INTRAMUSCULAR; INTRAVENOUS EVERY 6 HOURS PRN
Status: DISCONTINUED | OUTPATIENT
Start: 2025-04-18 | End: 2025-04-18

## 2025-04-18 RX ADMIN — SODIUM CHLORIDE, SODIUM LACTATE, POTASSIUM CHLORIDE, CALCIUM CHLORIDE: 600; 310; 30; 20 INJECTION, SOLUTION INTRAVENOUS at 17:54:00

## 2025-04-18 RX ADMIN — ROPIVACAINE HYDROCHLORIDE 20 ML: 5 INJECTION, SOLUTION EPIDURAL; INFILTRATION; PERINEURAL at 15:16:00

## 2025-04-18 RX ADMIN — LIDOCAINE HYDROCHLORIDE 30 MG: 10 INJECTION, SOLUTION EPIDURAL; INFILTRATION; INTRACAUDAL; PERINEURAL at 15:59:00

## 2025-04-18 RX ADMIN — EPHEDRINE SULFATE 5 MG: 50 INJECTION, SOLUTION INTRAVENOUS at 16:18:00

## 2025-04-18 RX ADMIN — PHENYLEPHRINE HCL 100 MCG: 10 MG/ML VIAL (ML) INJECTION at 16:44:00

## 2025-04-18 RX ADMIN — ROCURONIUM BROMIDE 50 MG: 10 INJECTION, SOLUTION INTRAVENOUS at 15:59:00

## 2025-04-18 RX ADMIN — SODIUM CHLORIDE, SODIUM LACTATE, POTASSIUM CHLORIDE, CALCIUM CHLORIDE: 600; 310; 30; 20 INJECTION, SOLUTION INTRAVENOUS at 15:49:00

## 2025-04-18 NOTE — H&P
H&P was reviewed, no new changes  Heart RRR  Lungs non labored    I discussed with the patient and/or legal representative the potential benefits, risks and side effects of this procedure; the likelihood of the patient achieving goals; and potential problems that might occur during recuperation.  I discussed reasonable alternatives to the procedure, including risks, benefits and side effects related to the alternatives and risks related to not receiving this procedure.  We will proceed with procedure as planned.      CHIEF COMPLAINT   Patient presents with:  Left Elbow - Follow - Up      HISTORY OF PRESENT ILLNESS   Serene Okeefe is a 85 year old female who returns for follow up with regards to left elbow, she states she like to discuss surgery for this at this time, she and her  both state that she has had significant dysfunction, and weakness, and states that she would like to discuss operative intervention.     There have been no new associated symptoms.    UPDATED MEDICAL HISTORY AND REVIEW OF SYSTEMS   Comprehensive review of systems performed, negative only as per HPI  The medical history is well detailed in the chart.     Review of systems:  General: No fever,  Respiratory: No shortness of breath  Cardiac: No chest pain  GI: No nausea  Musculoskeletal: As discussed in HPI    PHYSICAL EXAMINATION   General: Well-nourished, no acute distress  Unchanged exam    RADIOGRAPHS   Reviewed    IMPRESSIONS   Serene Okeefe is a 85 year old female who presents with the following diagnoses:  1. Subacute left olecranon fracture    PLAN   Diagnosis, prognosis, and treatment options were reviewed.   Today, we discussed her findings today, as well as potential risk benefits and alternatives of this procedure, she states she would like to proceed with surgical intervention citing significant weakness of her elbow extension as well as difficulty utilizing her walker and explained important risk associated with  this including risk of anesthesia, risk of infection as well as the recovery necessary thereafter he expressed understanding of this    Decision today regarding elective major surgery with identified procedure and patient risk factors. I discussed with the patient and/or legal representative the potential benefits, risks and side effects of this procedure; the likelihood of the patient achieving goals; and potential problems that might occur during recuperation. I discussed reasonable alternatives to the procedure, including risks, benefits and side effects related to the alternatives and risks related to not receiving this procedure. We will proceed with procedure as planned.

## 2025-04-18 NOTE — ANESTHESIA PROCEDURE NOTES
Peripheral Block    Date/Time: 4/18/2025 3:16 PM    Performed by: Flaco Garrido MD  Authorized by: Flaco Garrido MD      General Information and Staff    Start Time:  4/18/2025 3:16 PM  End Time:  4/18/2025 3:26 PM  Anesthesiologist:  Flaco Garrido MD  Performed by:  Anesthesiologist  Patient Location:  PACU    Block Placement: Pre Induction  Site Identification: real time ultrasound guided and image stored and retrievable      Reason for Block: at surgeon's request and post-op pain management    Preanesthetic Checklist: 2 patient identifers, IV checked, risks and benefits discussed, monitors and equipment checked, pre-op evaluation, timeout performed, anesthesia consent and sterile technique used      Procedure Details    Patient Position:  Sitting  Prep: ChloraPrep    Monitoring:  Cardiac monitor  Block Type:  Supraclavicular  Laterality:  Left  Injection Technique:  Single-shot    Needle    Needle Type:  Short-bevel  Needle Gauge:  22 G  Needle Length:  50 mm  Needle Localization:  Ultrasound guidance  Reason for Ultrasound Use: appropriate spread of the medication was noted in real time and no ultrasound evidence of intravascular and/or intraneural injection            Assessment    Injection Assessment:  Good spread noted, incremental injection, local visualized surrounding nerve on ultrasound, low pressure, negative aspiration for heme and no pain on injection  Heart Rate Change: No        Medications  4/18/2025 3:16 PM  ropivacaine (NAROPIN) injection 0.5% - Infiltration   20 mL - 4/18/2025 3:16:00 PM    Additional Comments

## 2025-04-18 NOTE — ANESTHESIA POSTPROCEDURE EVALUATION
Patient: Serene Okeefe    Procedure Summary       Date: 04/18/25 Room / Location: [facility] MAIN OR 03 / [facility] MAIN OR    Anesthesia Start: 1547 Anesthesia Stop:     Procedure: Left elbow olecranon open reduction internal fixation (Left) Diagnosis: (Closed fracture of olecranon process of left ulna)    Surgeons: Ghanshyam Romano MD Anesthesiologist: Karlee Robledo MD    Anesthesia Type: general ASA Status: 3            Anesthesia Type: general    Vitals Value Taken Time   /85 04/18/25 17:54   Temp 97.5 °F (36.4 °C) 04/18/25 17:54   Pulse 104 04/18/25 17:55   Resp 27 04/18/25 17:55   SpO2 95 % 04/18/25 17:55   Vitals shown include unfiled device data.    [facility] AN Post Evaluation:   Patient Evaluated in PACU  Patient Participation: complete - patient participated  Level of Consciousness: awake  Pain Management: adequate  Airway Patency:patent  Dental exam unchanged from preop  Yes    Cardiovascular Status: acceptable  Respiratory Status: acceptable  Postoperative Hydration acceptable      KARLEE ROBLEDO MD  4/18/2025 5:55 PM

## 2025-04-18 NOTE — OPERATIVE REPORT
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  This is an inherently unstable fracture, with a tendency to further displace and significantly impair elbow function.  Based on this, we discussed open reduction and internal fixation of the olecranon with the patient.  After an extensive discussion of risks, benefits, and alternatives, she elected to proceed.    Procedure: The patient was seen in the preoperative holding area where the diagnosis and surgical procedure was confirmed.  The surgical site was marked and initialed.    The patient was brought to the operating room and placed in to a lateral position on the operating table.  Surgical timeout was performed before and after administration of anesthesia to confirm correct patient, procedure, operative extremity, operating room team, presence of all necessary equipment, and lack of any contraindications to surgery. The Left upper extremity was exsanguinated and a pneumatic tourniquet placed on the upper arm inflated.      Live fluoroscopy was utilized, demonstrating a displaced olecranon fracture  A posterior approach to the ulna was used.  the incision was taken down through skin and subcutaneous tissue.  Skin flaps were elevated off of the  forearm musculature distally.  The interval between the FCU and the issue was identified     The forearm musculature was elevated from the medial and lateral sides of the posterior ulna to expose the underlying bone and fracture site.    There was a noted pseudoarthrosis of the joint, with expression of clear viscous material, no sign of infection, the capsule of the pseudoarthrosis was meticulously debrided with a knife, with caution to protect the surrounding structures, this was noted to proceed towards the ulnohumeral joint, next the fracture site was identified, and fibrous tissue blocking the fracture site was also debrided, several punctate areas were appears to allow for bony trabeculation and encourage healing  The remainder of the  fracture was cleared of interposed periosteum and soft tissue using a scalpel, curette, and irrigation.  The fracture fragments were reduced. .  The fracture was temporarily held in place with the assistance of  K-wires.    A posterior olecranon plate was chosen for fixation.    It was positioned on to the posterior olecranon, pinned in place, and then the positioning and reduction confirmed fluoroscopically.  The distal shaft screws were placed first, followed by the proximal locking cluster.  The proximal oblique screw was placed last.  The K-wires were removed.  Screw lengths, position, and reduction were confirmed once more with fluoroscopy.      The forearm fascia and musculature was closed over the plate.  The wound was then closed in layers the incision was dressed with xeroform, gauze, webril, and a posterior long-arm splint.  The tourniquet was deflated and all fingers perfused well.    Estimated Blood Loss: Minimal  Findings: As noted above.  Complications: none    Implants:   Implant Name Type Inv. Item Serial No.  Lot No. LRB No. Used Action   PLATE BNE OLECRANON PROX L 2 H ST 73MM SS - SNA  PLATE BNE OLECRANON PROX L 2 H ST 73MM SS NA [employer] WD  [id number] Left 1 Implanted   SCREW BNE 2.7X18MM VA T8 [employer] RECESS BRANDT - SNA  SCREW BNE 2.7X18MM VA T8 STARDRV RECESS BRANDT NA [employer] WD NA Left 1 Implanted   SCREW BNE 2.7X24MM VA T8 [employer]ARDRV RECESS BRANDT - SNA  SCREW BNE 2.7X24MM VA T8 STARDRV RECESS BRANDT NA [employer] WD NA Left 2 Implanted   SCREW BNE 2.7X26MM VA T8 [employer]AR[employer] RECESS BRANDT - SNA  SCREW BNE 2.7X26MM VA T8 STARDRV RECESS BRANDT NA [employer] WD NA Left 3 Implanted   SCREW BNE 2.7X32MM VA T8 [employer]AR[employer] RECESS BRANDT - SNA  SCREW BNE 2.7X32MM VA T8 [employer]ARDRV RECESS BRANDT NA [employer] WD NA Left 1 Implanted   SCREW BNE 3.5X22MM JASON HEX DRV RECESS FT ST - SNA  SCREW BNE 3.5X22MM JASON HEX DRV RECESS FT ST NA [employer] WD NA Left 1 Implanted   SCREW BNE 3.5X26MM JASON HEX DRV RECESS FT ST - SNA  SCREW BNE 3.5X26MM JASON HEX DRV RECESS FT ST NA [employer] WD NA Left 1 Implanted     Postoperative disposition: The patient was taken to the recovery unit in good condition.  with the plan to remain nonweightbearing to the upper extremity and follow-up in 2 weeks for a wound check and suture removal.

## 2025-04-18 NOTE — ANESTHESIA PROCEDURE NOTES
Airway  Date/Time: 4/18/2025 4:02 PM  Reason: elective      General Information and Staff   Patient location during procedure: OR  Resident/CRNA: Tank Kaufman CRNA  Performed: CRNA   Performed by: Tank Kaufman CRNA  Authorized by: Maria Fernanda Shafer MD        Indications and Patient Condition  Indications for airway management: anesthesia  Sedation level: deep      Preoxygenated: yesPatient position: sniffing  MILS maintained throughout    Mask difficulty assessment: 1 - vent by mask    Final Airway Details    Final airway type: endotracheal airway    Successful airway: ETT  Cuffed: yes   Successful intubation technique: Video laryngoscopy  Facilitating devices/methods: intubating stylet  Endotracheal tube insertion site: oral  Blade: Elier  Blade size: #3  ETT size (mm): 7.0    Cormack-Lehane Classification: grade I - full view of glottis  Placement verified by: capnometry   Measured from: lips  ETT to lips (cm): 21  Number of attempts at approach: 1  Ventilation between attempts: none  Number of other approaches attempted: 0

## 2025-04-18 NOTE — ANESTHESIA PREPROCEDURE EVALUATION
Anesthesia PreOp Note    HPI:     Serene Okeefe is a 85 year old female who presents for preoperative consultation requested by: Ghanshyam Romano MD    Date of Surgery: 4/18/2025    Procedure(s):  Left elbow olecranon open reduction internal fixation  Indication: Closed fracture of olecranon process of left ulna    Relevant Problems   No relevant active problems       NPO:  Last Liquid Consumption Date: 04/18/25  Last Liquid Consumption Time: 0730  Last Solid Consumption Date: 04/18/25  Last Solid Consumption Time: 1245  Last Liquid Consumption Date: 04/18/25          History Review:  Patient Active Problem List    Diagnosis Date Noted    Normocytic anemia     Calcaneovalgus deformity of both feet 09/01/2021    Sinus tarsi syndrome of right ankle 09/01/2021    IGT (impaired glucose tolerance)     Primary osteoarthritis of first carpometacarpal joint of right hand 02/14/2020    Primary osteoarthritis of first carpometacarpal joint of left hand 10/17/2019    Rotator cuff syndrome of both shoulders 07/11/2019    Primary osteoarthritis of right shoulder 04/16/2019    Uncomplicated opioid dependence (HCC) 03/02/2018    Other idiopathic scoliosis, lumbar region 11/15/2017    Lumbosacral spondylosis without myelopathy 11/15/2017    Chronic right-sided low back pain with right-sided sciatica 10/04/2017    Essential hypertension     Sensorineural hearing loss (SNHL), bilateral 05/25/2017    Tinnitus of both ears 05/25/2017    Eczema, unspecified type 07/12/2016    Mixed hyperlipidemia 01/08/2016    Moderate persistent asthma without complication (HCC) 01/08/2016    Primary osteoarthritis of both knees 07/15/2015    Primary osteoarthritis of cervical spine 07/15/2015    GERD without esophagitis     Senile osteoporosis     Psoriasis 07/09/2014    Graves' disease 07/09/2014       Past Medical History[1]    Past Surgical History[2]    Prescriptions Prior to Admission[3]  Current Medications and Prescriptions Ordered in  Epic[4]    Allergies[5]    Family History[6]  Social Hx on file[7]    Available pre-op labs reviewed.             Vital Signs:  Body mass index is 33.44 kg/m².   height is 1.473 m (4' 10\") and weight is 72.6 kg (160 lb). Her blood pressure is 118/66 and her pulse is 85. Her respiration is 18 and oxygen saturation is 99%.   Vitals:    04/16/25 1502 04/18/25 1317   BP:  118/66   Pulse:  85   Resp:  18   SpO2:  99%   Weight: 72.6 kg (160 lb) 72.6 kg (160 lb)   Height: 1.473 m (4' 10\") 1.473 m (4' 10\")        Anesthesia Evaluation     Patient summary reviewed and Nursing notes reviewed    History of anesthetic complications (hx of PONV)   Airway   Mallampati: II  TM distance: >3 FB  Neck ROM: full  Dental - Dentition appears grossly intact     Pulmonary - normal exam   (+) asthma  Cardiovascular - negative ROS and normal exam  Exercise tolerance: good  (+) hypertension, CHF    ROS comment: TTE: 10/23/24  Summary:     1. Left ventricle: The cavity size is normal. Wall thickness is normal. Systolic function is normal by visual assessment. The estimated ejection fraction is 60-65%. Wall motion is normal; there are no regional wall motion abnormalities. There is diastolic dysfunction.   2. Aortic valve: There is trivial stenosis. The mean systolic gradient is 8mm Hg. The valve area is 2.0cm^2. The valve area index is 1.21cm^2/m^2.   3. Mitral valve: The annulus is mildly calcified. The leaflets are mildly thickened. There is moderate regurgitation.   4. Left atrium: The atrium is moderately dilated.   5. Right ventricle: The cavity size is normal. Wall thickness is normal. Systolic function is normal by visual assessment. Estimation of the right ventricular systolic pressure is moderately increased. The RV pressure during systole is 58mm Hg.   6. Right atrium: The atrium is mildly dilated.   7. Tricuspid valve: There is moderate regurgitation.   8. Pericardium, extracardiac: There is no significant pericardial effusion.      PET stress test reviewed - no evidence of ischemia      Neuro/Psych    (+)  neuromuscular disease (Sciatica),        GI/Hepatic/Renal    (+) GERD    Endo/Other      Comments:  Normocytic normochromic anemia  Abdominal  - normal exam                 Anesthesia Plan:   ASA:  3  Plan:   General  Post-op Pain Management: Supraclavicular block  Plan Comments: I have discussed the anesthetic plan, major risks and alternatives with the patient and answered all questions.  Minor and major side effects were discussed with patient, including but not limited to: injury to teeth/gums/lips, aspiration, nausea/vomiting postoperatively, anaphylaxis, heart attack, stroke, post-operative ventilation and death. The patient desires to proceed with surgery and anesthesia as planned.       Informed Consent Plan and Risks Discussed With:  Patient  Discussed plan with:  Attending      I have informed Serene kOeefe and/or legal guardian or family member of the nature of the anesthetic plan, benefits, risks including possible dental damage if relevant, major complications, and any alternative forms of anesthetic management.   All of the patient's questions were answered to the best of my ability. The patient desires the anesthetic management as planned.  Flaco Garrido MD  4/18/2025 2:32 PM  Present on Admission:  **None**           [1]   Past Medical History:   Anemia    Asthma (HCC)    Back problem    Spine Fracture from Osteoporosis - No surgery but had total 4 week inpt rehad then outpatient rehab    Cancer (HCC)    Left Breast Cancer    Cataract    Congestive heart disease (HCC)    Possible start of heart failure    Eczema, unspecified type    Esophageal reflux    Essential hypertension    Exposure to medical diagnostic radiation    For breast cancer    GERD without esophagitis    Graves' disease    Hearing impaired person, bilateral    Bilateral Hearing Aids    History of blood transfusion    After knee replacement in the past.  No reaction.    History of diverticulitis of colon    6/4/2016    History of Gram positive sepsis    due to MRSA tonsillitis    History of left breast cancer    s/p lumpectomy 2008    History of MRSA infection    in Throat while in nursing home recovering from ORIF bilateral ankle    Hx of motion sickness    IGT (impaired glucose tolerance)    Low sodium levels    Natremia for the past several months. Follows up with Nephrologist    Mixed hyperlipidemia    Moderate persistent asthma without complication (HCC)    Morbid obesity due to excess calories (HCC)    Personal history of antineoplastic chemotherapy    Oral Chemo for 5 years    PONV (postoperative nausea and vomiting)    Primary osteoarthritis of both knees    Psoriasis    Sacral fracture, closed (HCC)    Seasonal allergies    Senile osteoporosis    sacral insufficiency fracture    Sensorineural hearing loss (SNHL), bilateral    Spondylosis of cervical region without myelopathy or radiculopathy   [2]   Past Surgical History:  Procedure Laterality Date    Ankle fracture surgery  01/2014    pilon fracture repair both legs    Carpal tunnel release  1973    left wrist    Cataract Bilateral     Colonoscopy  2012    normal few polyps    Colonoscopy  06/30/2016    polyp x 5,      Dexa bone density axial (cpt=77080)  7/23/2014, 7/26/16, 8/21/2018    Foot fracture surgery Left 1983    Left Foot Closed Reduction    Fracture surgery Left 1959    Left Wrist Closed Reduction    Knee replacement surgery  2008    left knee    Knee replacement surgery  1997    right    Knee surgery  1958    right knee meniscus removed    Leg/ankle surgery proc unlisted Left     Ankle arthroscopy and shaving of part of ankle bone    Lumpectomy left  2008    With a few lymph nodes removed.   [3]   Medications Prior to Admission   Medication Sig Dispense Refill Last Dose/Taking    fluticasone furoate 100 MCG/ACT Inhalation Aerosol Powder, Breath Activated Inhale 1 puff into the lungs  daily.   4/18/2025 at 11:00 AM    azelastine 0.1 % Nasal Solution 2 sprays by Nasal route 2 (two) times daily as needed.   Taking As Needed    denosumab 60 MG/ML Subcutaneous Solution Prefilled Syringe Inject 1 mL (60 mg total) into the skin every 6 (six) months.   3/18/2025    furosemide 20 MG Oral Tab Take 1 tablet (20 mg total) by mouth every morning.   4/17/2025 at 10:00 AM    guaiFENesin-codeine 100-10 MG/5ML Oral Solution Take 5 mL by mouth 4 (four) times daily as needed for cough.   4/18/2025 at 12:00 PM    HYDROcodone-acetaminophen (NORCO)  MG Oral Tab = 1 tab, Oral, Q4H, PRN pain, Tab, # 60 tab, 0 Refills, Maintenance   4/14/2025    latanoprost 0.005 % Ophthalmic Solution every morning.   4/18/2025 at 11:00 AM    magnesium 250 MG Oral Tab Take 1 tablet (250 mg total) by mouth 2 (two) times daily. Two tablets every morning for total of 500 mg   4/17/2025    atorvastatin 20 MG Oral Tab Take 1 tablet (20 mg total) by mouth daily. (Patient taking differently: Take 1 tablet (20 mg total) by mouth every morning.) 90 tablet 3 4/18/2025 at 11:00 AM    Omeprazole 40 MG Oral Capsule Delayed Release Take 1 capsule (40 mg total) by mouth daily. (Patient taking differently: Take 1 capsule (40 mg total) by mouth every morning.) 90 capsule 3 4/18/2025 at 11:00 AM    amLODIPine Besy-Benazepril HCl 5-20 MG Oral Cap Take 1 capsule by mouth daily. (Patient taking differently: Take 1 capsule by mouth every morning.) 90 capsule 3 4/17/2025 at 10:00 AM    ALBUTEROL SULFATE  (90 Base) MCG/ACT Inhalation Aero Soln USE 2 PUFFS BY MOUTH EVERY  6 HOURS AS NEEDED FOR  WHEEZING 34 g 3 4/18/2025 at 12:00 PM    Fluticasone Propionate 50 MCG/ACT Nasal Suspension Use 1 spray in each nostril daily 3 Bottle 3 4/18/2025 at 11:00 AM    Fluocinonide 0.05 % External Cream Apply 1 Application topically 2 (two) times daily. 30 g 6 4/18/2025    Acetaminophen  MG Oral Tab CR Take 1 tablet (650 mg total) by mouth every 8 (eight)  hours as needed for Pain.   4/18/2025 at 11:00 AM    Calcium Carb-Cholecalciferol (CALCIUM-VITAMIN D3) 500-400 MG-UNIT Oral Tab Take 1 tablet by mouth in the morning and 1 tablet in the evening. Take with meals.  0 4/17/2025    Loratadine 10 MG Oral Cap Take by mouth in the morning.   4/17/2025    Multiple Vitamins-Minerals (CENTURY MATURE OR) Take by mouth.   4/17/2025    predniSONE 20 MG Oral Tab TAKE ONE TABLET BY MOUTH TWICE DAILY FOR FIVE DAYS EACH ASTHMA FLARE 10 tablet 5 Unknown    triamcinolone 0.1 % External Cream Apply to the AA on elbows  BID x 2 weeks PRN, use 2 weeks on, 1 week off PRN. Do not apply to face, groin, or axilla.  Apply topical moisturizer 5-10 post steroid application. 60 g 0 Unknown    albuterol sulfate (2.5 MG/3ML) 0.083% Inhalation Nebu Soln Take 3 mL (2.5 mg total) by nebulization every 6 (six) hours as needed for Wheezing. 75 mL 1 Unknown    DICYCLOMINE HCL 10 MG Oral Cap TAKE 1 CAPSULE BY MOUTH 4  TIMES DAILY AS NEEDED . 360 capsule 3 Unknown   [4]   Current Facility-Administered Medications Ordered in Epic   Medication Dose Route Frequency Provider Last Rate Last Admin    lactated ringers infusion   Intravenous Continuous Rosalina, MD Ghanshyam 20 mL/hr at 04/18/25 1349 New Bag at 04/18/25 1349    acetaminophen (Tylenol Extra Strength) tab 1,000 mg  1,000 mg Oral Once RosalinaGhanshyam brian MD        famotidine (Pepcid) tab 20 mg  20 mg Oral Once RosalinaGhanshyam MD        Or    famotidine (Pepcid) 20 mg/2mL injection 20 mg  20 mg Intravenous Once RosalinaGhanshyam brian MD        metoclopramide (Reglan) tab 10 mg  10 mg Oral Once RosalinaGhanshyam brian MD        Or    metoclopramide (Reglan) 5 mg/mL injection 10 mg  10 mg Intravenous Once RosalinaGhanshyam brian MD        ceFAZolin (Ancef) 2g in 10mL IV syringe premix  2 g Intravenous Once RosalinaGhanshyam MD        And    vancomycin (Vancocin) 1,000 mg in sodium chloride 0.9% 250 mL IVPB-ADDV  15 mg/kg Intravenous Once RosalinaGhanshyam  mL/hr at 04/18/25 1410 1,000 mg at 04/18/25 1410     No  current Epic-ordered outpatient medications on file.   [5]   Allergies  Allergen Reactions    Advair Diskus [Fluticasone-Salmeterol] OTHER (SEE COMMENTS)     Pt states that her lungs feel like they are closing up.    Dilaudid [Hydromorphone] OTHER (SEE COMMENTS)     Pt states that her tonuge swelling   [6]   Family History  Problem Relation Age of Onset    Dementia Father     Cancer Father 90        Possible bladder    Stroke Mother 75    Hypertension Mother     Crohn's Disease Son     Prostate Cancer Paternal Grandfather     Obesity Brother     Cancer Other         colon aunt   [7]   Social History  Socioeconomic History    Marital status:    Tobacco Use    Smoking status: Never    Smokeless tobacco: Never   Vaping Use    Vaping status: Never Used   Substance and Sexual Activity    Alcohol use: No    Drug use: No

## (undated) DEVICE — DISPOSABLE TOURNIQUET CUFF DUAL BLADDER, DUAL PORT AND QUICK CONNECT CONNECTOR: Brand: COLOR CUFF

## (undated) DEVICE — MEDI-VAC NON-CONDUCTIVE SUCTION TUBING: Brand: CARDINAL HEALTH

## (undated) DEVICE — GLOVE SUR 8 SENSICARE PI MIC PIP CRM PWD F

## (undated) DEVICE — SUT MCRYL 4-0 27IN ABSRB UD L24MM PS-1

## (undated) DEVICE — TOWEL,OR,DSP,ST,BLUE,DLX,2/PK,40PK/CS: Brand: MEDLINE

## (undated) DEVICE — PACK CDS UPPER EXTREMITY

## (undated) DEVICE — APPLICATOR PREP 26ML CHG 2% ISO ALC 70%

## (undated) DEVICE — GLOVE SUR 8.5 SENSICARE PI PIP GRN PWD F

## (undated) DEVICE — GOWN,SIRUS,FABRNF,RAGLAN,L,ST,30/CS: Brand: MEDLINE

## (undated) DEVICE — SUT VCRL 3-0 18IN PS-2 ABSRB UD L19MM 3/8 CIR

## (undated) DEVICE — DRESSING ADAPTIC L16XW3IN OIL ADH

## (undated) DEVICE — ANTIBACTERIAL UNDYED BRAIDED (POLYGLACTIN 910), SYNTHETIC ABSORBABLE SUTURE: Brand: COATED VICRYL

## (undated) DEVICE — 3M™ IOBAN™ 2 ANTIMICROBIAL INCISE DRAPE 6650EZ: Brand: IOBAN™ 2

## (undated) DEVICE — C-ARM: Brand: UNBRANDED

## (undated) DEVICE — BANDAGE,GAUZE,4.5"X4.1YD,STERILE,LF: Brand: MEDLINE

## (undated) DEVICE — SLING ORTH L16.5IN D7IN M DK BLU POLY COT ARM

## (undated) DEVICE — BIT DRL 2X140MM QC CALIB W/ DEPTH MRK

## (undated) DEVICE — ZZ-CONVERTED-TO-522442- SPONGE 4X4 10PK

## (undated) DEVICE — SUT ETHLN 3-0 18IN PS-2 NABSRB BLK 19MM 3/8 C

## (undated) DEVICE — WAX BNE 2.5GM BEESWAX PAR AND ISO PALMITATE

## (undated) DEVICE — BNDG,ELSTC,MATRIX,STRL,4"X5YD,LF,HOOK&LP: Brand: MEDLINE

## (undated) DEVICE — BIT DRL 2.5X110MM G QC FOR LOK COMPR

## (undated) DEVICE — DRAPE ORTH 60IN X 70IN POLY FLM ANCIL U RSST

## (undated) DEVICE — 3M™ STERI-STRIP™ REINFORCED ADHESIVE SKIN CLOSURES, R1547, 1/2 IN X 4 IN (12 MM X 100 MM), 6 STRIPS/ENVELOPE: Brand: 3M™ STERI-STRIP™

## (undated) DEVICE — SUT COAT VCRL 2-0 27IN ABSRB UD 26MM CT-2